# Patient Record
(demographics unavailable — no encounter records)

---

## 2024-10-25 NOTE — HISTORY OF PRESENT ILLNESS
[de-identified] : The patient is a 15 year old L hand dominant female who presents today for right knee follow up Date of Surgery: 10/25/23 Pain:    At Rest: 0/10  With Activity:  0/10  Mechanism of injury: patient was playing lacrosse when another player collided with her, hitting the lateral aspect of the R knee This is [not] a Work Related Injury being treated under Worker's Compensation. This is [not] an athletic injury occurring associated with an interscholastic or organized sports team. Quality of symptoms: intermittent soreness Improves with: rest, ice Worse with: after PT Treatment/Imaging/Studies Since Last Visit:  working with - no c/o pain during therex or ADL's 	Reports Available For Review Today: none Out of work/sport: out of sports since 10/16/23 School/Sport/Position/Occupation: Student at [employer], Lacrosse (attack), soccer, basketball Changes since last visit: Reports increase in running recently- may be developing shin splints RT lower leg Additional Information: s/p right knee EUA, ACL recon with BTB Auto with IB, MMR, LMR, ALL Repair, MCL repair

## 2024-10-25 NOTE — IMAGING
[de-identified] : The patient is a well appearing 15 year old female of their stated age. Patient ambulates with a normal gait.  Negative straight leg raise bilaterally.   Surgical site: Right knee   Incision sites: Well-healed   Range of motion: 0-140 degrees   Motor Testin-/5 quadriceps strength   Stability Testing: Valgus jog at 30 degrees    Swelling/Effusion: None    Tenderness to palpation: None   Provocative testing: -LM/PS/AD, - squat jump, - triple hop   Right Calf: soft and nontender Left Calf: soft and nontender   Neurovascular Examination: Grossly intact, 2+ distal pulses Contralateral Extremity: Examination grossly benign  Assessment & Plan: The patient is approximately 1 year s/p right knee EUA, anterior cruciate ligament reconstruction with patella tendon autograft with internal brace, medial meniscus repair, lateral meniscus repair, anterolateral ligament repair, medial collateral ligament repair (DOS: 10/25/23). The patient's post-op plan, protocol and activity modifications have been thoroughly discussed and the patient expressed understanding. Continue physical therapy, HEP, and stretching. The patient has no activity restrictions at this time. Patient will continue to wear ACL DonJoy A22 brace with activities. The patient will control pain as discussed & continue ice and elevation as needed. The patient otherwise may advance activity as discussed. The patient will follow up on a PRN basis unless new symptoms arise.  The patient's current medication management of their orthopedic diagnosis was reviewed today: (1) The patient will continue with the PRN use of their prescribed anti-inflammatory. (2) There is a moderate risk of morbidity with further treatment, especially from use of prescription strength medications and possible side effects of these medications which include upset stomach with oral medications, skin reactions to topical medications and cardiac/renal issues with long term use. (3) I recommended that the patient follow-up with their medical physician to discuss any significant specific potential issues with long term medication use such as interactions with current medications or with exacerbation of underlying medical comorbidities. (4) The benefits and risks associated with use of injectable, oral or topical, prescription and over the counter anti-inflammatory medications were discussed with the patient. The patient voiced understanding of the risks including but not limited to bleeding, stroke, kidney dysfunction, heart disease, and were referred to the black box warning label for further information.  I, Roxy Griffin attest that this documentation has been prepared under the direction and in the presence of Provider Dr. Tan Maldonado.  The documentation recorded by the scribe accurately reflects the service I David Oconnor PA-C personally performed and the decisions made by me.

## 2024-10-25 NOTE — HISTORY OF PRESENT ILLNESS
[de-identified] : The patient is a 15 year old L hand dominant female who presents today for right knee follow up Date of Surgery: 10/25/23 Pain:    At Rest: 0/10  With Activity:  0/10  Mechanism of injury: patient was playing lacrosse when another player collided with her, hitting the lateral aspect of the R knee This is [not] a Work Related Injury being treated under Worker's Compensation. This is [not] an athletic injury occurring associated with an interscholastic or organized sports team. Quality of symptoms: intermittent soreness Improves with: rest, ice Worse with: after PT Treatment/Imaging/Studies Since Last Visit:  working with - no c/o pain during therex or ADL's 	Reports Available For Review Today: none Out of work/sport: out of sports since 10/16/23 School/Sport/Position/Occupation: Student at [employer], Lacrosse (attack), soccer, basketball Changes since last visit: Reports increase in running recently- may be developing shin splints RT lower leg Additional Information: s/p right knee EUA, ACL recon with BTB Auto with IB, MMR, LMR, ALL Repair, MCL repair

## 2024-10-25 NOTE — IMAGING
[de-identified] : The patient is a well appearing 15 year old female of their stated age. Patient ambulates with a normal gait.  Negative straight leg raise bilaterally.   Surgical site: Right knee   Incision sites: Well-healed   Range of motion: 0-140 degrees   Motor Testin-/5 quadriceps strength   Stability Testing: Valgus jog at 30 degrees    Swelling/Effusion: None    Tenderness to palpation: None   Provocative testing: -LM/PS/AD, - squat jump, - triple hop   Right Calf: soft and nontender Left Calf: soft and nontender   Neurovascular Examination: Grossly intact, 2+ distal pulses Contralateral Extremity: Examination grossly benign  Assessment & Plan: The patient is approximately 1 year s/p right knee EUA, anterior cruciate ligament reconstruction with patella tendon autograft with internal brace, medial meniscus repair, lateral meniscus repair, anterolateral ligament repair, medial collateral ligament repair (DOS: 10/25/23). The patient's post-op plan, protocol and activity modifications have been thoroughly discussed and the patient expressed understanding. Continue physical therapy, HEP, and stretching. The patient has no activity restrictions at this time. Patient will continue to wear ACL DonJoy A22 brace with activities. The patient will control pain as discussed & continue ice and elevation as needed. The patient otherwise may advance activity as discussed. The patient will follow up on a PRN basis unless new symptoms arise.  The patient's current medication management of their orthopedic diagnosis was reviewed today: (1) The patient will continue with the PRN use of their prescribed anti-inflammatory. (2) There is a moderate risk of morbidity with further treatment, especially from use of prescription strength medications and possible side effects of these medications which include upset stomach with oral medications, skin reactions to topical medications and cardiac/renal issues with long term use. (3) I recommended that the patient follow-up with their medical physician to discuss any significant specific potential issues with long term medication use such as interactions with current medications or with exacerbation of underlying medical comorbidities. (4) The benefits and risks associated with use of injectable, oral or topical, prescription and over the counter anti-inflammatory medications were discussed with the patient. The patient voiced understanding of the risks including but not limited to bleeding, stroke, kidney dysfunction, heart disease, and were referred to the black box warning label for further information.  I, Roxy Griffin attest that this documentation has been prepared under the direction and in the presence of Provider Dr. Tan Maldonado.  The documentation recorded by the scribe accurately reflects the service I David Oconnor PA-C personally performed and the decisions made by me.   22-Sep-2021 22:26